# Patient Record
Sex: FEMALE | Race: BLACK OR AFRICAN AMERICAN | NOT HISPANIC OR LATINO | Employment: FULL TIME | URBAN - METROPOLITAN AREA
[De-identification: names, ages, dates, MRNs, and addresses within clinical notes are randomized per-mention and may not be internally consistent; named-entity substitution may affect disease eponyms.]

---

## 2024-01-16 ENCOUNTER — TELEMEDICINE (OUTPATIENT)
Dept: PRIMARY CARE | Facility: CLINIC | Age: 39
End: 2024-01-16
Payer: COMMERCIAL

## 2024-01-16 DIAGNOSIS — J06.9 UPPER RESPIRATORY TRACT INFECTION, UNSPECIFIED TYPE: Primary | ICD-10-CM

## 2024-01-16 DIAGNOSIS — J30.2 SEASONAL ALLERGIES: ICD-10-CM

## 2024-01-16 PROCEDURE — 99213 OFFICE O/P EST LOW 20 MIN: CPT | Performed by: FAMILY MEDICINE

## 2024-01-16 RX ORDER — AMOXICILLIN AND CLAVULANATE POTASSIUM 875; 125 MG/1; MG/1
875 TABLET, FILM COATED ORAL 2 TIMES DAILY
Qty: 14 TABLET | Refills: 0 | Status: SHIPPED | OUTPATIENT
Start: 2024-01-16 | End: 2024-01-23

## 2024-01-16 RX ORDER — MONTELUKAST SODIUM 10 MG/1
10 TABLET ORAL DAILY
COMMUNITY
End: 2024-01-16 | Stop reason: SDUPTHER

## 2024-01-16 RX ORDER — MONTELUKAST SODIUM 10 MG/1
10 TABLET ORAL DAILY
Qty: 30 TABLET | Refills: 2 | Status: SHIPPED | OUTPATIENT
Start: 2024-01-16 | End: 2024-04-15

## 2024-01-16 RX ORDER — FLUCONAZOLE 150 MG/1
150 TABLET ORAL ONCE
Qty: 1 TABLET | Refills: 0 | Status: SHIPPED | OUTPATIENT
Start: 2024-01-16 | End: 2024-01-16

## 2024-01-16 ASSESSMENT — ENCOUNTER SYMPTOMS
MYALGIAS: 1
HEADACHES: 1
HEARTBURN: 0
COUGH: 1
CHILLS: 0
RHINORRHEA: 1
SWEATS: 1
WEIGHT LOSS: 0
SHORTNESS OF BREATH: 0
FEVER: 0
WHEEZING: 0
SORE THROAT: 1
HEMOPTYSIS: 0

## 2024-01-16 NOTE — PROGRESS NOTES
Answers submitted by the patient for this visit:  Cough Questionnaire (Submitted on 1/16/2024)  Chief Complaint: Cough  Chronicity: new  Onset: in the past 7 days  Progression since onset: gradually worsening  Frequency: constantly  Cough characteristics: productive of sputum  chest pain: No  chills: No  ear congestion: Yes  ear pain: Yes  fever: No  headaches: Yes  heartburn: No  hemoptysis: No  myalgias: Yes  nasal congestion: Yes  postnasal drip: No  rash: No  rhinorrhea: Yes  shortness of breath: No  sore throat: Yes  sweats: Yes  weight loss: No  wheezing: No  Aggravated by: cold air, stress    Subjective   Patient ID: Clementine Frazier is a 38 y.o. female who presents for No chief complaint on file..  Cough  This is a new problem. The current episode started in the past 7 days. The problem has been gradually worsening. The problem occurs constantly. The cough is Productive of sputum. Associated symptoms include ear congestion, ear pain, headaches, myalgias, nasal congestion, rhinorrhea, a sore throat and sweats. Pertinent negatives include no chest pain, chills, fever, heartburn, hemoptysis, postnasal drip, rash, shortness of breath, weight loss or wheezing. The symptoms are aggravated by cold air and stress.     1 week hx of history of upper respiratory   And it is not getting better.   Symptoms started on Friday   Sore throat   Runny nose.   Np fever. No chills   Mucous whitish color.       Review of Systems   Constitutional:  Negative for chills, fever and weight loss.   HENT:  Positive for ear pain, rhinorrhea and sore throat. Negative for postnasal drip.    Respiratory:  Positive for cough. Negative for hemoptysis, shortness of breath and wheezing.    Cardiovascular:  Negative for chest pain.   Gastrointestinal:  Negative for heartburn.   Musculoskeletal:  Positive for myalgias.   Skin:  Negative for rash.   Neurological:  Positive for headaches.       Past Medical History:   Diagnosis Date    Acute upper  respiratory infection, unspecified 03/18/2015    Acute upper respiratory infection    Acute vaginitis 06/03/2014    Acute vaginitis    Candidiasis, unspecified 07/31/2017    Yeast infection    Encounter for contraceptive management, unspecified 01/22/2015    Contraception management    Encounter for gynecological examination (general) (routine) without abnormal findings     Encounter for gynecological examination    Encounter for gynecological examination (general) (routine) without abnormal findings 04/05/2018    Visit for routine gyn exam    Encounter for screening for infections with a predominantly sexual mode of transmission 06/08/2015    Screen for STD (sexually transmitted disease)    Encounter for supervision of normal first pregnancy, unspecified trimester 10/21/2014    Pregnancy, first    Lower abdominal pain, unspecified 10/30/2014    Abdominal pain, lower    Other problems related to lifestyle 06/08/2015    Other problems related to lifestyle    Personal history of other diseases of the digestive system 10/30/2014    History of constipation    Personal history of other diseases of the female genital tract 07/24/2017    History of vaginitis    Personal history of other diseases of the respiratory system 10/25/2013    History of acute sinusitis       No past surgical history on file.   Social History     Socioeconomic History    Marital status: Single     Spouse name: Not on file    Number of children: Not on file    Years of education: Not on file    Highest education level: Not on file   Occupational History    Not on file   Tobacco Use    Smoking status: Not on file    Smokeless tobacco: Not on file   Substance and Sexual Activity    Alcohol use: Not on file    Drug use: Not on file    Sexual activity: Not on file   Other Topics Concern    Not on file   Social History Narrative    Not on file     Social Determinants of Health     Financial Resource Strain: Not on file   Food Insecurity: Not on file    Transportation Needs: Not on file   Physical Activity: Not on file   Stress: Not on file   Social Connections: Not on file   Intimate Partner Violence: Not on file   Housing Stability: Not on file      No family history on file.    MEDICATIONS AND ALLERGIES:    ALLERGIES Patient has no known allergies.    MEDICATIONS   Current Outpatient Medications on File Prior to Visit   Medication Sig Dispense Refill    [DISCONTINUED] montelukast (Singulair) 10 mg tablet Take 1 tablet (10 mg) by mouth once daily.       No current facility-administered medications on file prior to visit.        Objective   There were no vitals taken for this visit.   Physical Exam    Constitutional: awake; alert, interactive; in no acute distress; well nourished and well developed  ENT: ears and nose were normal in appearance;  Eyes: pupils equal and round  Pulmonary: no respiratory distress and normal respiratory rhythm and effort  Skin: normal skin color and pigmentation;  Psychiatric: oriented to person, place, and time; affect was normal and the mood was normal     1. Upper respiratory tract infection, unspecified type  Advised to check for COIVD aqnd flu   Contact us if positive   Will start amox/clav that she can take in 2 days if the symptoms persist.   Patient requesting diflucan as she gets vaginal yeast infection after antibiotic treatment.       - amoxicillin-pot clavulanate (Augmentin) 875-125 mg tablet; Take 1 tablet (875 mg) by mouth 2 times a day for 7 days.  Dispense: 14 tablet; Refill: 0  - fluconazole (Diflucan) 150 mg tablet; Take 1 tablet (150 mg) by mouth 1 time for 1 dose.  Dispense: 1 tablet; Refill: 0    2. Seasonal allergies  Will refill montelukast on pateint's request.   - montelukast (Singulair) 10 mg tablet; Take 1 tablet (10 mg) by mouth once daily.  Dispense: 30 tablet; Refill: 2